# Patient Record
Sex: MALE | Race: WHITE | NOT HISPANIC OR LATINO
[De-identification: names, ages, dates, MRNs, and addresses within clinical notes are randomized per-mention and may not be internally consistent; named-entity substitution may affect disease eponyms.]

---

## 2017-05-08 ENCOUNTER — APPOINTMENT (OUTPATIENT)
Dept: OTOLARYNGOLOGY | Facility: CLINIC | Age: 62
End: 2017-05-08

## 2017-05-08 VITALS
WEIGHT: 175 LBS | HEART RATE: 70 BPM | SYSTOLIC BLOOD PRESSURE: 123 MMHG | BODY MASS INDEX: 26.52 KG/M2 | HEIGHT: 68 IN | DIASTOLIC BLOOD PRESSURE: 78 MMHG

## 2017-09-18 ENCOUNTER — APPOINTMENT (OUTPATIENT)
Dept: ORTHOPEDIC SURGERY | Facility: CLINIC | Age: 62
End: 2017-09-18
Payer: COMMERCIAL

## 2017-09-18 PROCEDURE — 72100 X-RAY EXAM L-S SPINE 2/3 VWS: CPT

## 2017-09-18 PROCEDURE — 72040 X-RAY EXAM NECK SPINE 2-3 VW: CPT

## 2017-09-18 PROCEDURE — 99214 OFFICE O/P EST MOD 30 MIN: CPT

## 2017-09-18 RX ORDER — TOBRAMYCIN AND DEXAMETHASONE 3; 1 MG/G; MG/G
0.3-0.1 OINTMENT OPHTHALMIC
Qty: 4 | Refills: 0 | Status: ACTIVE | COMMUNITY
Start: 2017-05-08

## 2017-09-18 RX ORDER — SILDENAFIL CITRATE 100 MG/1
100 TABLET, FILM COATED ORAL
Qty: 3 | Refills: 0 | Status: ACTIVE | COMMUNITY
Start: 2017-08-29

## 2017-09-18 RX ORDER — BENZONATATE 100 MG/1
100 CAPSULE ORAL
Qty: 42 | Refills: 0 | Status: ACTIVE | COMMUNITY
Start: 2017-07-12

## 2017-09-18 RX ORDER — BECLOMETHASONE DIPROPIONATE MONOHYDRATE 42 UG/1
42 SPRAY, SUSPENSION NASAL
Qty: 25 | Refills: 0 | Status: ACTIVE | COMMUNITY
Start: 2017-04-18

## 2017-09-18 RX ORDER — OLOPATADINE HYDROCHLORIDE 2 MG/ML
0.2 SOLUTION OPHTHALMIC
Qty: 3 | Refills: 0 | Status: ACTIVE | COMMUNITY
Start: 2017-06-19

## 2017-09-18 RX ORDER — FLUOROMETHOLONE 1 MG/G
0.1 OINTMENT OPHTHALMIC
Qty: 4 | Refills: 0 | Status: ACTIVE | COMMUNITY
Start: 2017-06-05

## 2017-10-23 ENCOUNTER — APPOINTMENT (OUTPATIENT)
Dept: ORTHOPEDIC SURGERY | Facility: CLINIC | Age: 62
End: 2017-10-23

## 2018-03-15 ENCOUNTER — APPOINTMENT (OUTPATIENT)
Dept: OTOLARYNGOLOGY | Facility: CLINIC | Age: 63
End: 2018-03-15
Payer: COMMERCIAL

## 2018-03-15 VITALS
HEIGHT: 68 IN | WEIGHT: 180 LBS | BODY MASS INDEX: 27.28 KG/M2 | DIASTOLIC BLOOD PRESSURE: 74 MMHG | SYSTOLIC BLOOD PRESSURE: 121 MMHG | HEART RATE: 72 BPM

## 2018-03-15 DIAGNOSIS — H92.03 OTALGIA, BILATERAL: ICD-10-CM

## 2018-03-15 DIAGNOSIS — H61.23 IMPACTED CERUMEN, BILATERAL: ICD-10-CM

## 2018-03-15 PROCEDURE — 69210 REMOVE IMPACTED EAR WAX UNI: CPT

## 2018-03-15 PROCEDURE — 99213 OFFICE O/P EST LOW 20 MIN: CPT | Mod: 25

## 2018-09-06 ENCOUNTER — APPOINTMENT (OUTPATIENT)
Dept: ORTHOPEDIC SURGERY | Facility: CLINIC | Age: 63
End: 2018-09-06
Payer: COMMERCIAL

## 2018-09-06 VITALS
HEIGHT: 68 IN | HEART RATE: 62 BPM | BODY MASS INDEX: 26.98 KG/M2 | DIASTOLIC BLOOD PRESSURE: 78 MMHG | WEIGHT: 178 LBS | SYSTOLIC BLOOD PRESSURE: 128 MMHG

## 2018-09-06 PROCEDURE — 99201 OFFICE OUTPATIENT NEW 10 MINUTES: CPT

## 2018-09-06 PROCEDURE — 73130 X-RAY EXAM OF HAND: CPT | Mod: LT

## 2018-09-27 ENCOUNTER — APPOINTMENT (OUTPATIENT)
Dept: ORTHOPEDIC SURGERY | Facility: CLINIC | Age: 63
End: 2018-09-27
Payer: COMMERCIAL

## 2018-09-27 DIAGNOSIS — Z86.03 PERSONAL HISTORY OF NEOPLASM OF UNCERTAIN BEHAVIOR: ICD-10-CM

## 2018-09-27 PROCEDURE — 99212 OFFICE O/P EST SF 10 MIN: CPT

## 2018-09-29 ENCOUNTER — APPOINTMENT (OUTPATIENT)
Dept: MRI IMAGING | Facility: CLINIC | Age: 63
End: 2018-09-29
Payer: COMMERCIAL

## 2018-09-29 ENCOUNTER — OUTPATIENT (OUTPATIENT)
Dept: OUTPATIENT SERVICES | Facility: HOSPITAL | Age: 63
LOS: 1 days | End: 2018-09-29
Payer: COMMERCIAL

## 2018-09-29 DIAGNOSIS — M79.642 PAIN IN LEFT HAND: ICD-10-CM

## 2018-09-29 PROCEDURE — 73218 MRI UPPER EXTREMITY W/O DYE: CPT | Mod: 26,LT

## 2018-09-29 PROCEDURE — 73218 MRI UPPER EXTREMITY W/O DYE: CPT

## 2018-10-12 ENCOUNTER — APPOINTMENT (OUTPATIENT)
Dept: ORTHOPEDIC SURGERY | Facility: CLINIC | Age: 63
End: 2018-10-12
Payer: COMMERCIAL

## 2018-10-12 PROCEDURE — 20600 DRAIN/INJ JOINT/BURSA W/O US: CPT | Mod: LT

## 2018-10-12 PROCEDURE — 73130 X-RAY EXAM OF HAND: CPT | Mod: LT

## 2018-10-12 PROCEDURE — 99213 OFFICE O/P EST LOW 20 MIN: CPT | Mod: 25

## 2019-03-21 ENCOUNTER — APPOINTMENT (OUTPATIENT)
Dept: ORTHOPEDIC SURGERY | Facility: CLINIC | Age: 64
End: 2019-03-21

## 2019-03-21 VITALS — BODY MASS INDEX: 26.98 KG/M2 | WEIGHT: 178 LBS | HEIGHT: 68 IN

## 2019-03-21 DIAGNOSIS — M77.9 ENTHESOPATHY, UNSPECIFIED: ICD-10-CM

## 2019-03-21 RX ORDER — SODIUM SULFATE, POTASSIUM SULFATE, MAGNESIUM SULFATE 17.5; 3.13; 1.6 G/ML; G/ML; G/ML
17.5-3.13-1.6 SOLUTION, CONCENTRATE ORAL
Qty: 354 | Refills: 0 | Status: DISCONTINUED | COMMUNITY
Start: 2018-02-23 | End: 2019-03-21

## 2019-03-21 RX ORDER — AMOXICILLIN 500 MG/1
500 CAPSULE ORAL
Qty: 42 | Refills: 0 | Status: DISCONTINUED | COMMUNITY
Start: 2017-07-02 | End: 2019-03-21

## 2019-03-21 RX ORDER — ACYCLOVIR 400 MG/1
400 TABLET ORAL
Qty: 21 | Refills: 0 | Status: DISCONTINUED | COMMUNITY
Start: 2016-10-26 | End: 2019-03-21

## 2019-03-21 RX ORDER — OSELTAMIVIR PHOSPHATE 75 MG/1
75 CAPSULE ORAL
Qty: 10 | Refills: 0 | Status: DISCONTINUED | COMMUNITY
Start: 2018-02-16 | End: 2019-03-21

## 2019-03-22 NOTE — DISCUSSION/SUMMARY
[de-identified] : The patient does not consent to a cortisone injection.\par An MRI of the left hand will be ordered to r/o soft tissue pathology of the small finger. \par NSAIDs as tolerated.\par

## 2019-03-22 NOTE — PHYSICAL EXAM
[de-identified] : 3view Xrays  AP, lateral and oblique views of the  left hand were obtained and reveal no abnormalities.  [de-identified] : Patient is well-nourished, well developed, alert and in no acute distress. Breathing is unlabored. He is grossly oriented to person, place and time.\par \par Right Hand: There is tenderness present over the proximal phalanx of the small finger, mild tenderness  over the A1 pulley. there is also edema present. He has full arc of motion in the fingers, all intrinsic and extrinsic hand muscles 5/5, no joint instability on provocative testing,  sensation intact to light touch, no skin lesions or discoloration \par

## 2019-03-22 NOTE — ADDENDUM
[FreeTextEntry1] : I, Radha Denney wrote this note acting as a scribe for Dr. Fernando Michelle on Mar 21, 2019.

## 2019-03-22 NOTE — HISTORY OF PRESENT ILLNESS
[Right] : right hand dominant [FreeTextEntry1] : Pt c/o left little finger pain x 7 weeks.  He states that the pain initially started whenever he abducted his fingers but last week the finger swelled and became discolored.  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  The swelling and discoloration has improved but he continues to have pain.  The pain is localized to the ulnar portion of the MP joint.  Denies triggering, numbness or tingling. He states the pain has not resolved. .

## 2019-03-22 NOTE — END OF VISIT
[FreeTextEntry3] : I, Fernando Michelle MD, ordering physician, have read and attest that all the information, medical decision making and discharge instructions within are true and accurate.

## 2019-03-25 ENCOUNTER — APPOINTMENT (OUTPATIENT)
Dept: ORTHOPEDIC SURGERY | Facility: CLINIC | Age: 64
End: 2019-03-25
Payer: COMMERCIAL

## 2019-03-25 PROCEDURE — 20550 NJX 1 TENDON SHEATH/LIGAMENT: CPT | Mod: F1

## 2019-03-25 PROCEDURE — 99214 OFFICE O/P EST MOD 30 MIN: CPT | Mod: 25

## 2019-03-25 NOTE — DISCUSSION/SUMMARY
[de-identified] : The patient wishes to proceed with a cortisone injection at this time. The skin was prepped with alcohol and sprayed with Ethyl Chloride. An injection of 0.5 cc 1% Lidocaine without epinephrine, 0.25 cc Kenalog 40mg, and 0.25 cc Dexamethasone was administered into the flexor tendon sheath of the left index finger. The patient tolerated the procedure well. Apply ice. \par \par The patient was advised to soak hand in warm water and Epsom salt. \par NSAIDs as tolerated. \par Follow up as needed. \par

## 2019-03-25 NOTE — HISTORY OF PRESENT ILLNESS
[de-identified] : Patient is a 63 year old male who presents today c/o left index finger pain and swelling.  He states that the pain initially started whenever he abducted his fingers.  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  The pain is localized to the ulnar portion of the MP and PIP joint. The pain is worse in the mornings. Denies triggering, numbness or tingling. \par \par

## 2019-03-25 NOTE — PHYSICAL EXAM
[de-identified] : Patient is well-nourished, well developed, alert and in no acute distress. Breathing is unlabored. He is grossly oriented to person, place and time.\par \par Left Hand: There is tenderness present over the proximal phalanx of the small finger, mild tenderness  over the A1 pulley. there is also edema present. He has full arc of motion in the fingers, all intrinsic and extrinsic hand muscles 5/5, no joint instability on provocative testing,  sensation intact to light touch, no skin lesions or discoloration \par  [de-identified] : Xrays reviewed from 09/27/2018: AP, lateral and oblique views of the  left hand were obtained and reveal no abnormalities.

## 2019-03-25 NOTE — ADDENDUM
[FreeTextEntry1] : I, Radha Denney wrote this note acting as a scribe for Dr. Fernando Michelle on Mar 25, 2019.

## 2019-04-22 ENCOUNTER — APPOINTMENT (OUTPATIENT)
Dept: ORTHOPEDIC SURGERY | Facility: CLINIC | Age: 64
End: 2019-04-22

## 2019-04-25 ENCOUNTER — APPOINTMENT (OUTPATIENT)
Dept: ORTHOPEDIC SURGERY | Facility: CLINIC | Age: 64
End: 2019-04-25
Payer: COMMERCIAL

## 2019-04-25 VITALS — BODY MASS INDEX: 26.98 KG/M2 | WEIGHT: 178 LBS | HEIGHT: 68 IN

## 2019-04-25 DIAGNOSIS — M79.642 PAIN IN LEFT HAND: ICD-10-CM

## 2019-04-25 DIAGNOSIS — M65.9 SYNOVITIS AND TENOSYNOVITIS, UNSPECIFIED: ICD-10-CM

## 2019-04-25 PROCEDURE — 20550 NJX 1 TENDON SHEATH/LIGAMENT: CPT | Mod: LT

## 2019-04-25 PROCEDURE — 99213 OFFICE O/P EST LOW 20 MIN: CPT | Mod: 25

## 2019-04-25 NOTE — ADDENDUM
[FreeTextEntry1] : I, Radha Denney wrote this note acting as a scribe for Dr. Fernando Michelle on Apr 25, 2019.

## 2019-04-25 NOTE — DISCUSSION/SUMMARY
[de-identified] : The patient wishes to proceed with a 2nd cortisone injection at this time. The skin was prepped with alcohol and sprayed with Ethyl Chloride. An injection of 0.5 cc 1% Lidocaine without epinephrine, 0.25 cc Kenalog 40mg, and 0.25 cc Dexamethasone was administered into the flexor tendon sheath of the left index finger. The patient tolerated the procedure well. Apply ice. \par \par The patient was advised to soak hand in warm water and Epsom salt. \par NSAIDs as tolerated. \par Follow up as needed. \par \par

## 2019-04-25 NOTE — HISTORY OF PRESENT ILLNESS
[de-identified] : Patient is a 64 year old male who presents today for a followup visit involving the left index finger pain and swelling.  He states that the pain initially started whenever he abducted his fingers.  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  The pain is localized to the ulnar portion of the MP and PIP joint. The pain is worse in the mornings. He was treated with a cortisone injection in this office on 04/25/2019. He says that the injection worked but his symptoms have since returned. He would like another injection today.

## 2019-04-25 NOTE — PHYSICAL EXAM
[de-identified] : Patient is well-nourished, well developed, alert and in no acute distress. Breathing is unlabored. He is grossly oriented to person, place and time.\par \par Left Hand: There is tenderness present over the proximal phalanx of the small finger, mild tenderness  over the A1 pulley. there is also edema present. He has full arc of motion in the fingers, all intrinsic and extrinsic hand muscles 5/5, no joint instability on provocative testing,  sensation intact to light touch, no skin lesions or discoloration \par  [de-identified] : No imaging done today.

## 2020-01-17 ENCOUNTER — OUTPATIENT (OUTPATIENT)
Dept: OUTPATIENT SERVICES | Facility: HOSPITAL | Age: 65
LOS: 1 days | End: 2020-01-17
Payer: COMMERCIAL

## 2020-01-17 ENCOUNTER — APPOINTMENT (OUTPATIENT)
Dept: ULTRASOUND IMAGING | Facility: CLINIC | Age: 65
End: 2020-01-17
Payer: COMMERCIAL

## 2020-01-17 DIAGNOSIS — Z00.8 ENCOUNTER FOR OTHER GENERAL EXAMINATION: ICD-10-CM

## 2020-01-17 PROCEDURE — 76870 US EXAM SCROTUM: CPT | Mod: 26

## 2020-01-17 PROCEDURE — 76870 US EXAM SCROTUM: CPT

## 2020-10-09 ENCOUNTER — TRANSCRIPTION ENCOUNTER (OUTPATIENT)
Age: 65
End: 2020-10-09

## 2021-07-12 ENCOUNTER — APPOINTMENT (OUTPATIENT)
Dept: ORTHOPEDIC SURGERY | Facility: CLINIC | Age: 66
End: 2021-07-12
Payer: COMMERCIAL

## 2021-07-12 VITALS
OXYGEN SATURATION: 95 % | HEART RATE: 67 BPM | WEIGHT: 189 LBS | DIASTOLIC BLOOD PRESSURE: 93 MMHG | BODY MASS INDEX: 28.64 KG/M2 | SYSTOLIC BLOOD PRESSURE: 137 MMHG | HEIGHT: 68 IN

## 2021-07-12 DIAGNOSIS — M54.5 LOW BACK PAIN: ICD-10-CM

## 2021-07-12 PROCEDURE — 99072 ADDL SUPL MATRL&STAF TM PHE: CPT

## 2021-07-12 PROCEDURE — 99214 OFFICE O/P EST MOD 30 MIN: CPT

## 2021-07-12 PROCEDURE — 72110 X-RAY EXAM L-2 SPINE 4/>VWS: CPT

## 2021-07-12 NOTE — PHYSICAL EXAM
[de-identified] : Lumbar Physical Exam\par \par Gait - Normal\par \par Station - Normal\par \par Sagittal balance - Normal\par \par Compensatory mechanism? - None\par \par Heel walk - Normal\par \par Toe walk - Normal\par \par Reflexes\par Patellar - normal\par Gastroc - normal\par Clonus - No\par \par Hip Exam - Normal\par \par Straight leg raise - none\par \par Pulses - 2+ dp/pt\par \par Range of motion - normal\par \par Sensation \par Sensation intact to light touch in L1, L2, L3, L4, L5 and S1 dermatomes bilaterally\par \par Motor\par 	IP	Quad	HS	TA	Gastroc	EHL\par Right	4/5	5/5	5/5	5/5	5/5	5/5\par Left	4/5	5/5	5/5	5/5	5/5	5/5 [de-identified] : Lumbar radiographs\par Degenerative scoliosis noted\par Disc height loss\par Slight motion at L4-L5 with flexion extension

## 2021-07-12 NOTE — HISTORY OF PRESENT ILLNESS
[de-identified] :  this is a 66-year-old male that has been dealing with severe low back pain for years.  Unfortunately acutely over the past several months it has severely worsened.  He has a stabbing pain in his right lower back.  He does describe pain that goes down his posterior thighs  bilaterally as well.  At this point it does interfere with his quality of life.  He does feel better when he leans forward on a shopping cart.  He denies any bowel bladder issues.  He denies any saddle anesthesia.

## 2021-07-12 NOTE — ASSESSMENT
[FreeTextEntry1] : I had a lengthy discussion with the patient in regards to their treatment plan and diagnosis.  They do have objective weakness findings on my exam.  Their symptoms have persisted despite the conservative management they have attempted thus far.  As a result I would like to proceed with a lumbar MRI.  In tandem with this they should begin physical therapy/home therapy program.  The patient can take Tylenol/NSAIDs as needed for pain control if medically able to.  I will have the patient follow-up in 3 to 4 weeks for repeat clinical evaluation.  I encouraged them to follow-up sooner if their symptoms worsen or change in any way.  Please note that over 30 minutes of time spent in care of this patient which includes previsit preparation, in person visit, post visit documentation.

## 2021-07-23 ENCOUNTER — OUTPATIENT (OUTPATIENT)
Dept: OUTPATIENT SERVICES | Facility: HOSPITAL | Age: 66
LOS: 1 days | End: 2021-07-23
Payer: SELF-PAY

## 2021-07-23 ENCOUNTER — APPOINTMENT (OUTPATIENT)
Dept: MRI IMAGING | Facility: IMAGING CENTER | Age: 66
End: 2021-07-23
Payer: SELF-PAY

## 2021-07-23 DIAGNOSIS — M54.5 LOW BACK PAIN: ICD-10-CM

## 2021-07-23 PROCEDURE — 72148 MRI LUMBAR SPINE W/O DYE: CPT

## 2021-07-23 PROCEDURE — 72148 MRI LUMBAR SPINE W/O DYE: CPT | Mod: 26

## 2021-08-04 ENCOUNTER — APPOINTMENT (OUTPATIENT)
Dept: MRI IMAGING | Facility: CLINIC | Age: 66
End: 2021-08-04

## 2021-08-06 ENCOUNTER — APPOINTMENT (OUTPATIENT)
Dept: ORTHOPEDIC SURGERY | Facility: CLINIC | Age: 66
End: 2021-08-06
Payer: COMMERCIAL

## 2021-08-06 DIAGNOSIS — M51.36 OTHER INTERVERTEBRAL DISC DEGENERATION, LUMBAR REGION: ICD-10-CM

## 2021-08-06 PROCEDURE — 99213 OFFICE O/P EST LOW 20 MIN: CPT | Mod: 95

## 2021-08-06 NOTE — HISTORY OF PRESENT ILLNESS
[de-identified] : Today the patient states that overall he is doing very well.  He has lost weight.  He feels like his low back pain has significantly improved.  He denies any severe radiating pain down his legs.  He denies any weakness.  He denies any focal areas of numbness or tingling.  He denies any bowel bladder issues.  He denies any saddle anesthesia.\par \par 07/12/21\par  this is a 66-year-old male that has been dealing with severe low back pain for years.  Unfortunately acutely over the past several months it has severely worsened.  He has a stabbing pain in his right lower back.  He does describe pain that goes down his posterior thighs  bilaterally as well.  At this point it does interfere with his quality of life.  He does feel better when he leans forward on a shopping cart.  He denies any bowel bladder issues.  He denies any saddle anesthesia.

## 2021-08-06 NOTE — ASSESSMENT
[FreeTextEntry1] : I had a lengthy discussion with the patient in regards to treatment plan and diagnosis. There are no red flag findings on imaging nor are there any red flag findings on clinical exam.  Therefore we will proceed with a course of conservative treatment.  This would include physical therapy/home exercise program, Tylenol, NSAIDs as medically indicated.  The patient will follow up with me in approximately 8-12 weeks.  I encouraged the patient to follow-up sooner if there are any new or worsening symptoms.

## 2021-08-06 NOTE — PHYSICAL EXAM
[de-identified] : Lumbar Physical Exam\par \par Gait - Normal\par \par Station - Normal\par \par Sagittal balance - Normal\par \par Compensatory mechanism? - None\par \par Heel walk - Normal\par \par Toe walk - Normal\par \par Reflexes\par Patellar - normal\par Gastroc - normal\par Clonus - No\par \par Hip Exam - Normal\par \par Straight leg raise - none\par \par Pulses - 2+ dp/pt\par \par Range of motion - normal\par \par Sensation \par Sensation intact to light touch in L1, L2, L3, L4, L5 and S1 dermatomes bilaterally\par \par Motor\par 	IP	Quad	HS	TA	Gastroc	EHL\par Right	5/5	5/5	5/5	5/5	5/5	5/5\par Left	5/5	5/5	5/5	5/5	5/5	5/5 [de-identified] : Lumbar radiographs\par Degenerative scoliosis noted\par Disc height loss\par Slight motion at L4-L5 with flexion extension\par \par Lumbar MRI reviewed\par No areas of critical central stenosis\par No areas of critical foraminal stenosis

## 2021-08-06 NOTE — REASON FOR VISIT
[Follow-Up Visit] : a follow-up visit for [Back Pain] : back pain [Radiculopathy] : radiculopathy [Home] : at home, [unfilled] , at the time of the visit. [Medical Office: ([facility])___] : at the medical office located in  [Verbal consent obtained from patient] : the patient, [unfilled]

## 2022-10-14 ENCOUNTER — NON-APPOINTMENT (OUTPATIENT)
Age: 67
End: 2022-10-14

## 2022-10-24 ENCOUNTER — EMERGENCY (EMERGENCY)
Facility: HOSPITAL | Age: 67
LOS: 1 days | Discharge: HOME | End: 2022-10-24
Attending: EMERGENCY MEDICINE | Admitting: EMERGENCY MEDICINE

## 2022-10-24 VITALS
OXYGEN SATURATION: 98 % | TEMPERATURE: 98 F | HEART RATE: 165 BPM | WEIGHT: 186.07 LBS | DIASTOLIC BLOOD PRESSURE: 91 MMHG | RESPIRATION RATE: 17 BRPM | SYSTOLIC BLOOD PRESSURE: 168 MMHG

## 2022-10-24 DIAGNOSIS — Z98.890 OTHER SPECIFIED POSTPROCEDURAL STATES: Chronic | ICD-10-CM

## 2022-10-24 LAB
ALBUMIN SERPL ELPH-MCNC: 3.8 G/DL — SIGNIFICANT CHANGE UP (ref 3.4–5)
ALP SERPL-CCNC: 47 U/L — SIGNIFICANT CHANGE UP (ref 40–120)
ALT FLD-CCNC: 27 U/L — SIGNIFICANT CHANGE UP (ref 12–42)
ANION GAP SERPL CALC-SCNC: 7 MMOL/L — LOW (ref 9–16)
APTT BLD: 34 SEC — SIGNIFICANT CHANGE UP (ref 27.5–35.5)
AST SERPL-CCNC: 33 U/L — SIGNIFICANT CHANGE UP (ref 15–37)
BASOPHILS # BLD AUTO: 0.07 K/UL — SIGNIFICANT CHANGE UP (ref 0–0.2)
BASOPHILS NFR BLD AUTO: 1.2 % — SIGNIFICANT CHANGE UP (ref 0–2)
BILIRUB SERPL-MCNC: 0.6 MG/DL — SIGNIFICANT CHANGE UP (ref 0.2–1.2)
BUN SERPL-MCNC: 17 MG/DL — SIGNIFICANT CHANGE UP (ref 7–23)
CALCIUM SERPL-MCNC: 8.9 MG/DL — SIGNIFICANT CHANGE UP (ref 8.5–10.5)
CHLORIDE SERPL-SCNC: 106 MMOL/L — SIGNIFICANT CHANGE UP (ref 96–108)
CO2 SERPL-SCNC: 28 MMOL/L — SIGNIFICANT CHANGE UP (ref 22–31)
CREAT SERPL-MCNC: 1.11 MG/DL — SIGNIFICANT CHANGE UP (ref 0.5–1.3)
EGFR: 74 ML/MIN/1.73M2 — SIGNIFICANT CHANGE UP
EOSINOPHIL # BLD AUTO: 0.14 K/UL — SIGNIFICANT CHANGE UP (ref 0–0.5)
EOSINOPHIL NFR BLD AUTO: 2.4 % — SIGNIFICANT CHANGE UP (ref 0–6)
GLUCOSE SERPL-MCNC: 95 MG/DL — SIGNIFICANT CHANGE UP (ref 70–99)
HCT VFR BLD CALC: 44.7 % — SIGNIFICANT CHANGE UP (ref 39–50)
HGB BLD-MCNC: 14.7 G/DL — SIGNIFICANT CHANGE UP (ref 13–17)
IMM GRANULOCYTES NFR BLD AUTO: 0.2 % — SIGNIFICANT CHANGE UP (ref 0–0.9)
INR BLD: 1.09 — SIGNIFICANT CHANGE UP (ref 0.88–1.16)
LYMPHOCYTES # BLD AUTO: 2.01 K/UL — SIGNIFICANT CHANGE UP (ref 1–3.3)
LYMPHOCYTES # BLD AUTO: 34.8 % — SIGNIFICANT CHANGE UP (ref 13–44)
MAGNESIUM SERPL-MCNC: 2 MG/DL — SIGNIFICANT CHANGE UP (ref 1.6–2.6)
MCHC RBC-ENTMCNC: 28.5 PG — SIGNIFICANT CHANGE UP (ref 27–34)
MCHC RBC-ENTMCNC: 32.9 GM/DL — SIGNIFICANT CHANGE UP (ref 32–36)
MCV RBC AUTO: 86.6 FL — SIGNIFICANT CHANGE UP (ref 80–100)
MONOCYTES # BLD AUTO: 0.61 K/UL — SIGNIFICANT CHANGE UP (ref 0–0.9)
MONOCYTES NFR BLD AUTO: 10.6 % — SIGNIFICANT CHANGE UP (ref 2–14)
NEUTROPHILS # BLD AUTO: 2.93 K/UL — SIGNIFICANT CHANGE UP (ref 1.8–7.4)
NEUTROPHILS NFR BLD AUTO: 50.8 % — SIGNIFICANT CHANGE UP (ref 43–77)
NRBC # BLD: 0 /100 WBCS — SIGNIFICANT CHANGE UP (ref 0–0)
NT-PROBNP SERPL-SCNC: 157 PG/ML — SIGNIFICANT CHANGE UP
PLATELET # BLD AUTO: 196 K/UL — SIGNIFICANT CHANGE UP (ref 150–400)
POTASSIUM SERPL-MCNC: 3.8 MMOL/L — SIGNIFICANT CHANGE UP (ref 3.5–5.3)
POTASSIUM SERPL-SCNC: 3.8 MMOL/L — SIGNIFICANT CHANGE UP (ref 3.5–5.3)
PROT SERPL-MCNC: 7 G/DL — SIGNIFICANT CHANGE UP (ref 6.4–8.2)
PROTHROM AB SERPL-ACNC: 12.8 SEC — SIGNIFICANT CHANGE UP (ref 10.5–13.4)
RBC # BLD: 5.16 M/UL — SIGNIFICANT CHANGE UP (ref 4.2–5.8)
RBC # FLD: 13.3 % — SIGNIFICANT CHANGE UP (ref 10.3–14.5)
SARS-COV-2 RNA SPEC QL NAA+PROBE: SIGNIFICANT CHANGE UP
SODIUM SERPL-SCNC: 141 MMOL/L — SIGNIFICANT CHANGE UP (ref 132–145)
TROPONIN I, HIGH SENSITIVITY RESULT: 8.9 NG/L — SIGNIFICANT CHANGE UP
WBC # BLD: 5.77 K/UL — SIGNIFICANT CHANGE UP (ref 3.8–10.5)
WBC # FLD AUTO: 5.77 K/UL — SIGNIFICANT CHANGE UP (ref 3.8–10.5)

## 2022-10-24 PROCEDURE — 71045 X-RAY EXAM CHEST 1 VIEW: CPT | Mod: 26

## 2022-10-24 PROCEDURE — 93010 ELECTROCARDIOGRAM REPORT: CPT | Mod: 76

## 2022-10-24 PROCEDURE — 99291 CRITICAL CARE FIRST HOUR: CPT

## 2022-10-24 RX ORDER — OMEGA-3 ACID ETHYL ESTERS 1 G
0 CAPSULE ORAL
Qty: 0 | Refills: 0 | DISCHARGE

## 2022-10-24 RX ORDER — DILTIAZEM HCL 120 MG
20 CAPSULE, EXT RELEASE 24 HR ORAL ONCE
Refills: 0 | Status: COMPLETED | OUTPATIENT
Start: 2022-10-24 | End: 2022-10-24

## 2022-10-24 RX ORDER — DILTIAZEM HCL 120 MG
90 CAPSULE, EXT RELEASE 24 HR ORAL ONCE
Refills: 0 | Status: COMPLETED | OUTPATIENT
Start: 2022-10-24 | End: 2022-10-24

## 2022-10-24 RX ORDER — ALPRAZOLAM 0.25 MG
1 TABLET ORAL ONCE
Refills: 0 | Status: DISCONTINUED | OUTPATIENT
Start: 2022-10-24 | End: 2022-10-24

## 2022-10-24 RX ORDER — CHOLECALCIFEROL (VITAMIN D3) 125 MCG
0 CAPSULE ORAL
Qty: 0 | Refills: 0 | DISCHARGE

## 2022-10-24 RX ORDER — APIXABAN 2.5 MG/1
5 TABLET, FILM COATED ORAL ONCE
Refills: 0 | Status: COMPLETED | OUTPATIENT
Start: 2022-10-24 | End: 2022-10-24

## 2022-10-24 RX ADMIN — Medication 90 MILLIGRAM(S): at 22:26

## 2022-10-24 RX ADMIN — APIXABAN 5 MILLIGRAM(S): 2.5 TABLET, FILM COATED ORAL at 16:47

## 2022-10-24 RX ADMIN — Medication 20 MILLIGRAM(S): at 14:14

## 2022-10-24 RX ADMIN — Medication 1 MILLIGRAM(S): at 23:57

## 2022-10-24 RX ADMIN — Medication 90 MILLIGRAM(S): at 14:39

## 2022-10-24 NOTE — ED PROVIDER NOTE - PHYSICAL EXAMINATION
VITAL SIGNS: I have reviewed nursing notes and confirm.  CONSTITUTIONAL: Well-developed; well-nourished; in no acute distress.  SKIN: Skin is warm and dry, no acute rash.  HEAD: Normocephalic; atraumatic.  EYES: PERRL, EOM intact; conjunctiva and sclera clear.  ENT: No nasal discharge; airway clear.  NECK: Supple; non tender.  CARD: S1, S2 normal; no murmurs, gallops, or rubs. Regular rate and rhythm.  RESP: No wheezes, rales or rhonchi.  ABD: Normal bowel sounds; soft; non-distended; non-tender; no hepatosplenomegaly.  MSK: Normal ROM. No clubbing, cyanosis or edema.  NEURO: Alert, oriented. Grossly unremarkable.  PSYCH: Cooperative, appropriate. VITAL SIGNS: I have reviewed nursing notes and confirm.  CONSTITUTIONAL: Well-developed; well-nourished; in no acute distress.  SKIN: Skin is warm and dry.  HEAD: Normocephalic; atraumatic.  EYES: Clear b/l.  ENT: On nasal cannula.   NECK: Supple; non tender.  CARD: Tachycardic, irregular rhythm.   RESP: No respiratory distress.   ABD: Soft; non-distended; non-tender.  MSK: Normal ROM. No clubbing, cyanosis or edema.  NEURO: Alert, oriented. Grossly unremarkable.  PSYCH: Cooperative, appropriate. VITAL SIGNS: I have reviewed nursing notes and confirm.  CONSTITUTIONAL: Well-developed; well-nourished; in no acute distress.  SKIN: Skin is warm and dry.  HEAD: Normocephalic; atraumatic.  EYES: Clear b/l.  NECK: Supple; non tender.  CARD: Tachycardic, irregular rhythm.   RESP: No respiratory distress. CTA b/l  ABD: Soft; non-distended; non-tender.  MSK: Normal ROM. No clubbing, cyanosis or edema.  NEURO: Alert, oriented. Grossly unremarkable.  PSYCH: Cooperative, appropriate.

## 2022-10-24 NOTE — ED PROVIDER NOTE - PROGRESS NOTE DETAILS
[facility] [phone]  spoke to Dr. Jasen Younger covering lisa Mcclellan accepting physician at [facility] in [facility] [phone]  spoke to Dr. Jasen Younger covering lisa Mcclellan accepting physician at [place] in [facility] Pt admitted to [facility], however wants to be transferred to [facility] on [address].   [facility] called and case discussed- phone number [phone].   Dr. Mcclellan is the accepting physician (cardiologist) at [facility] and case discussed with him.   Paperwork faxed over. Will get call back from [facility] with bed availability. Pt made aware of this plan.   spoke to Dr. Jasen Younger covering lisa Mcclellan accepting physician at [facility] in [facility] Pt admitted to [facility]/ cardiology, however pt wants to be transferred to [facility] on [address].   [facility] called and case discussed- phone number [phone].   Dr. Mcclellan is the accepting physician (cardiologist) at [facility] and case discussed with him.   Paperwork faxed over. Will get call back from [facility] with bed availability. Pt made aware of this plan.   Also spoke to Dr. Jasen Younger covering for pt's Cardiologist Dr. Jim Corado and case discussed. Overnight patient had no events heart rate remains in the high 80s sometimes goes up to 100 patient received 90 mg of Cardizem at signout from prior attending at 10 PM we will redosed in the morning, as well as give 5 mg of his Eliquis in the morning.  Repeat morning labs are within normal limits.  There are currently no cardiac telemetry beds and patient is awaiting a cardiac telemetry bed at [facility] we will sign out to morning a.mElliot Horner overnight no events, given am eliquis dose and iv and po diltiazem. signed out to am md pending transfer. patient endorsed pending [facility] or [facility] bed. overnight dosed with po diltiazem, and had received IV prior. DARRYL: Patient signed out to me by Dr. España at 8am. Patient is resting comfortably, NAD.  Dr. Montes De Oca at bedside. DARRYL: Patient seen by Dr. Guzman. Recommended diltiazem 60mg twice a day and Eliquis 5mg twice a day. will follow up with his own cardiologist within a week. Return to the ED immediately if getting worse, not improving, or if having any new or troubling symptoms. DARRYL: Patient signed out to me by Dr. España at 8am. Patient is resting comfortably, NAD.  Dr. Guzman at bedside.

## 2022-10-24 NOTE — ED PROVIDER NOTE - CLINICAL SUMMARY MEDICAL DECISION MAKING FREE TEXT BOX
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 ED course: vital signs noted. Pt is afebrile, tachycardic with HR in the 150s, hypertensive with BP at 160/91, and the rest of the vitals are within normal limits. ECG done immediately and pt found to be in rapid Afib with HR in the 150s. No nonspecific ST changes. Will obtain cardiac workup, including cardiac enzymes, and chest X-ray. Pt given Cardizem IV and PO dose for rate control. To be admitted to cardiology for Afib with rapid ventricular response. 68 yo M PMHx of Afib and L sided thoracotomy due to capillary hemangioma in his lung decades ago, takes lorazepam to sleep, presenting today for palpitations, SOB, lightheadedness, dizziness, and near syncope. Pt states that around 11:17AM today he was waiting for the train but it switched tracks, causing him to go up a couple of flight of stairs and then down again which is when the symptoms began. Pt went to the closest Urgent care and was found to be in afib with RVR and was sent to [facility] for further evaluation.  Pt had an episode of afib with RVR 15 years ago and was cardioverted at that time. He was not started on any anti-coagulation. At that time pt had a stress test and echo which were normal. He has had no episodes of afib since them. No other hx of heart issues, CAD or heart failure. Non smoker, social alcohol drinker, no drug use. Denies CP, abdominal pain, leg pain or swelling, fever, chills.    ED course: vital signs noted. Pt is afebrile, tachycardic with HR in the 150s, hypertensive with BP at 160/91, and the rest of the vitals are within normal limits. ECG done immediately and pt found to be in rapid Afib with HR in the 140s and with nonspecific ST changes. CXR with NAD. Labs noted and WNL. Trop negative. Pt given Cardizem IV and PO for rate control with HR improving to 90s/low 100s. Remained in afib. Eliquis given. Pt admitted to Cardiology for afib with RVR. Case discussed with Dr. Bustamante (Cardiology).

## 2022-10-24 NOTE — ED PROVIDER NOTE - WR ORDER DATE AND TIME 1
24-Oct-2022 14:08
I will STOP taking the medications listed below when I get home from the hospital:    hydroCHLOROthiazide 12.5 mg oral capsule  -- 1 cap(s) by mouth once a day    metoprolol tartrate 25 mg oral tablet  -- 0.5 tab(s) by mouth every 12 hours

## 2022-10-24 NOTE — ED PROVIDER NOTE - PATIENT PORTAL LINK FT
You can access the FollowMyHealth Patient Portal offered by [facility] by registering at the following website: [email or web address]. By joining [email or web address]’s FollowMyHealth portal, you will also be able to view your health information using other applications (apps) compatible with our system.

## 2022-10-24 NOTE — ED ADULT NURSE NOTE - CHIEF COMPLAINT QUOTE
pt. brought in by EMS picked up from [facility] for possible afib. Pt. reports he was rushing to the train when he started to feel his heart race

## 2022-10-24 NOTE — ED PROVIDER NOTE - NSFOLLOWUPINSTRUCTIONS_ED_ALL_ED_FT
A-fib (Atrial Fibrillation)    WHAT YOU NEED TO KNOW:    Atrial fibrillation (A-fib) is an irregular heartbeat. It reduces your heart's ability to pump blood through your body. A-fib may come and go, or it may be a long-term condition. A-fib can cause blood clots, stroke, or heart failure. These conditions may become life-threatening. It is important to treat and manage A-fib to help prevent a blood clot, stroke, or heart failure.  Heart Chambers         DISCHARGE INSTRUCTIONS:    Call your local emergency number (911 in the [place]) or have someone call if:   •You have any of the following signs of a heart attack: ?Squeezing, pressure, or pain in your chest      ?You may also have any of the following: ?Discomfort or pain in your back, neck, jaw, stomach, or arm      ?Shortness of breath      ?Nausea or vomiting      ?Lightheadedness or a sudden cold sweat        •You have any of the following signs of a stroke: ?Numbness or drooping on one side of your face       ?Weakness in an arm or leg      ?Confusion or difficulty speaking      ?Dizziness, a severe headache, or vision loss        Return to the emergency department if:   •Your arm or leg feels warm, tender, and painful. It may look swollen and red.      •Your heart rate is more than 110 beats per minute.      •You are short of breath, even at rest.      Call your doctor or cardiologist if:   •You have new or worsening swelling in your legs, feet, ankles, or abdomen.       •You have questions or concerns about your condition or care.      Medicines: You may need any of the following:  •Heart medicines help control your heart rate or rhythm. You may need more than one medicine to treat your symptoms.      •Blood thinners help prevent blood clots. Clots can cause strokes, heart attacks, and death. The following are general safety guidelines to follow while you are taking a blood thinner:?Watch for bleeding and bruising while you take blood thinners. Watch for bleeding from your gums or nose. Watch for blood in your urine and bowel movements. Use a soft washcloth on your skin, and a soft toothbrush to brush your teeth. This can keep your skin and gums from bleeding. If you shave, use an electric shaver. Do not play contact sports.       ?Tell your dentist and other healthcare providers that you take a blood thinner. Wear a bracelet or necklace that says you take this medicine.       ?Do not start or stop any other medicines unless your healthcare provider tells you to. Many medicines cannot be used with blood thinners.      ?Take your blood thinner exactly as prescribed by your healthcare provider. Do not skip does or take less than prescribed. Tell your provider right away if you forget to take your blood thinner, or if you take too much.      ?Warfarin is a blood thinner that you may need to take. The following are things you should be aware of if you take warfarin: ?Foods and medicines can affect the amount of warfarin in your blood. Do not make major changes to your diet while you take warfarin. Warfarin works best when you eat about the same amount of vitamin K every day. Vitamin K is found in green leafy vegetables and certain other foods. Ask for more information about what to eat when you are taking warfarin.      ?You will need to see your healthcare provider for follow-up visits when you are on warfarin. You will need regular blood tests. These tests are used to decide how much medicine you need.         •Antiplatelets, such as aspirin, help prevent blood clots. Take your antiplatelet medicine exactly as directed. These medicines make it more likely for you to bleed or bruise. If you are told to take aspirin, do not take acetaminophen or ibuprofen instead.      •Take your medicine as directed. Contact your healthcare provider if you think your medicine is not helping or if you have side effects. Tell your provider if you are allergic to any medicine. Keep a list of the medicines, vitamins, and herbs you take. Include the amounts, and when and why you take them. Bring the list or the pill bottles to follow-up visits. Carry your medicine list with you in case of an emergency.      Manage A-fib:   •Know your target heart rate. Learn how to check your pulse and monitor your heart rate.  How to Take a Pulse           •Know the risks if you choose to drink alcohol. Alcohol can increase your risk for A-fib or make A-fib harder to manage. Ask your healthcare provider if it is okay for you to drink any alcohol. He or she can help you set limits for the number of drinks you have in 24 hours and in a week. A drink of alcohol is 12 ounces of beer, 5 ounces of wine, or 1½ ounces of liquor.      •Do not smoke. Nicotine can cause heart damage and make it more difficult to manage your A-fib. Do not use e-cigarettes or smokeless tobacco in place of cigarettes or to help you quit. They still contain nicotine. Ask your healthcare provider for information if you currently smoke and need help quitting.      •Eat heart-healthy foods. Heart healthy foods will help keep your cholesterol low. These include fruits, vegetables, whole-grain breads, low-fat dairy products, beans, lean meats, and fish. Replace butter and margarine with heart-healthy oils such as olive oil and canola oil.             •Maintain a healthy weight. Ask your healthcare provider what a healthy weight is for you. Ask him or her to help you create a safe weight loss plan if needed. Even a small goal of a 10% weight loss can improve your heart health.      •Get regular physical activity. Physical activity helps improve your heart health. Get at least 150 minutes of moderate aerobic physical activity each week. Your healthcare provider can help you create an activity plan.  [employer] Walking for Exercise           •Manage other health conditions. This includes high blood pressure or cholesterol, sleep apnea, diabetes, and other heart conditions. Take medicine as directed and follow your treatment plan. Your healthcare provider may need to change a medicine you are taking if it is causing your A-fib. Do not stop taking any medicine unless directed by your provider.      Follow up with your doctor or cardiologist as directed: You will need regular blood tests and monitoring. Write down your questions so you remember to ask them during your visits.       © Copyright [employer] 2022           back to top                          © Copyright [employer] 2022

## 2022-10-24 NOTE — ED ADULT NURSE REASSESSMENT NOTE - REASSESS COMMUNICATION
/77, , afib on monitor. MD verbalized understanding, states give PO cardizem and rpt EKG/ED physician notified

## 2022-10-24 NOTE — ED ADULT TRIAGE NOTE - CHIEF COMPLAINT QUOTE
pt. brought in by EMS picked up from [facility] for possible afib. Pt. reports he was rushing to the train when he started to feel his heart race

## 2022-10-24 NOTE — ED PROVIDER NOTE - OBJECTIVE STATEMENT
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 Pt states that around 11:17AM today he was waiting for the train but it switched tracks, causing him to go up a flight of stairs and then down again, which is when the symptoms began. Pt went to the closest  and was sent here for further evaluation.  Pt believes he might be in Afib because 15 years ago pt contacted his PCP about an elevated HR after a chiropractor workout and was told by his PCP that he might have Afib. Pt then had a stress test, which was normal, and had been cardioverted at [facility] in [place]. Since then, he has had no episodes. Not placed on anticoagulations. No other hx of heart issues. Non smoker, social alcohol drinker, no drug use. Allergy to Levaquin. Denies CP, abdominal pain, leg pain or swelling, fever, chills. 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 Pt states that around 11:17AM today he was waiting for the train but it switched tracks, causing him to go up a flight of stairs and then down again, which is when the symptoms began. Pt went to the closest  and was sent here for further evaluation.  Pt believes he might be in Afib because 15 years ag, he had was diagnosed with Afib. At that time, he had a stress test, which was normal, and then had been cardioverted at [facility] in [place]. Since then, he has had no episodes with this being the first. Not started on anticoagulations. No other hx of heart issues. Non smoker, social alcohol drinker, no drug use. Allergy to Levaquin. Denies CP, abdominal pain, leg pain or swelling, fever, chills. 66 yo M PMHx of Afib and L sided thoracotomy due to capillary hemangioma in his lung decades ago, takes lorazepam to sleep, presenting today for palpitations, SOB, lightheadedness, dizziness, and near syncope. Pt states that around 11:17AM today he was waiting for the train but it switched tracks, causing him to go up a couple of flight of stairs and then down again which is when the symptoms began. Pt went to the closest Urgent care and was found to be in afib with RVR and was sent to [facility] for further evaluation.  Pt had an episode of afib with RVR 15 years ago and was cardioverted at that time. He was not started on any anti-coagulation. At that time pt had a stress test and echo which were normal. He has had no episodes of afib since them. No other hx of heart issues, CAD or heart failure. Non smoker, social alcohol drinker, no drug use. Denies CP, abdominal pain, leg pain or swelling, fever, chills.

## 2022-10-24 NOTE — ED ADULT NURSE REASSESSMENT NOTE - NS ED NURSE REASSESS COMMENT FT1
Pt AAOx4, notified of orders for bedrest, but continues to get OOB for BR and other things around the room. Pt endorses mild palpitations with exertion, but states SOB has improved. Cardiac monitoring continued.

## 2022-10-24 NOTE — ED ADULT NURSE NOTE - OBJECTIVE STATEMENT
Pt AAOx4, c/o sudden onset chest palpitations and SOB. States h/o afib with cardioversion in the past, not on any meds at this time.

## 2022-10-25 ENCOUNTER — APPOINTMENT (OUTPATIENT)
Dept: ORTHOPEDIC SURGERY | Facility: CLINIC | Age: 67
End: 2022-10-25

## 2022-10-25 VITALS
DIASTOLIC BLOOD PRESSURE: 76 MMHG | SYSTOLIC BLOOD PRESSURE: 112 MMHG | RESPIRATION RATE: 18 BRPM | OXYGEN SATURATION: 95 % | TEMPERATURE: 98 F | HEART RATE: 78 BPM

## 2022-10-25 LAB
ALBUMIN SERPL ELPH-MCNC: 3.3 G/DL — LOW (ref 3.4–5)
ALP SERPL-CCNC: 48 U/L — SIGNIFICANT CHANGE UP (ref 40–120)
ALT FLD-CCNC: 23 U/L — SIGNIFICANT CHANGE UP (ref 12–42)
ANION GAP SERPL CALC-SCNC: 6 MMOL/L — LOW (ref 9–16)
AST SERPL-CCNC: 17 U/L — SIGNIFICANT CHANGE UP (ref 15–37)
BASOPHILS # BLD AUTO: 0.06 K/UL — SIGNIFICANT CHANGE UP (ref 0–0.2)
BASOPHILS NFR BLD AUTO: 0.9 % — SIGNIFICANT CHANGE UP (ref 0–2)
BILIRUB SERPL-MCNC: 0.5 MG/DL — SIGNIFICANT CHANGE UP (ref 0.2–1.2)
BUN SERPL-MCNC: 18 MG/DL — SIGNIFICANT CHANGE UP (ref 7–23)
CALCIUM SERPL-MCNC: 8.9 MG/DL — SIGNIFICANT CHANGE UP (ref 8.5–10.5)
CHLORIDE SERPL-SCNC: 106 MMOL/L — SIGNIFICANT CHANGE UP (ref 96–108)
CK SERPL-CCNC: 102 U/L — SIGNIFICANT CHANGE UP (ref 39–308)
CO2 SERPL-SCNC: 28 MMOL/L — SIGNIFICANT CHANGE UP (ref 22–31)
CREAT SERPL-MCNC: 1 MG/DL — SIGNIFICANT CHANGE UP (ref 0.5–1.3)
EGFR: 84 ML/MIN/1.73M2 — SIGNIFICANT CHANGE UP
EOSINOPHIL # BLD AUTO: 0.22 K/UL — SIGNIFICANT CHANGE UP (ref 0–0.5)
EOSINOPHIL NFR BLD AUTO: 3.3 % — SIGNIFICANT CHANGE UP (ref 0–6)
GLUCOSE SERPL-MCNC: 109 MG/DL — HIGH (ref 70–99)
HCT VFR BLD CALC: 41.8 % — SIGNIFICANT CHANGE UP (ref 39–50)
HGB BLD-MCNC: 13.8 G/DL — SIGNIFICANT CHANGE UP (ref 13–17)
IMM GRANULOCYTES NFR BLD AUTO: 0.1 % — SIGNIFICANT CHANGE UP (ref 0–0.9)
LYMPHOCYTES # BLD AUTO: 2.18 K/UL — SIGNIFICANT CHANGE UP (ref 1–3.3)
LYMPHOCYTES # BLD AUTO: 32.3 % — SIGNIFICANT CHANGE UP (ref 13–44)
MAGNESIUM SERPL-MCNC: 2 MG/DL — SIGNIFICANT CHANGE UP (ref 1.6–2.6)
MCHC RBC-ENTMCNC: 28.2 PG — SIGNIFICANT CHANGE UP (ref 27–34)
MCHC RBC-ENTMCNC: 33 GM/DL — SIGNIFICANT CHANGE UP (ref 32–36)
MCV RBC AUTO: 85.5 FL — SIGNIFICANT CHANGE UP (ref 80–100)
MONOCYTES # BLD AUTO: 0.6 K/UL — SIGNIFICANT CHANGE UP (ref 0–0.9)
MONOCYTES NFR BLD AUTO: 8.9 % — SIGNIFICANT CHANGE UP (ref 2–14)
NEUTROPHILS # BLD AUTO: 3.68 K/UL — SIGNIFICANT CHANGE UP (ref 1.8–7.4)
NEUTROPHILS NFR BLD AUTO: 54.5 % — SIGNIFICANT CHANGE UP (ref 43–77)
NRBC # BLD: 0 /100 WBCS — SIGNIFICANT CHANGE UP (ref 0–0)
NT-PROBNP SERPL-SCNC: 1883 PG/ML — HIGH
PLATELET # BLD AUTO: 172 K/UL — SIGNIFICANT CHANGE UP (ref 150–400)
POTASSIUM SERPL-MCNC: 3.7 MMOL/L — SIGNIFICANT CHANGE UP (ref 3.5–5.3)
POTASSIUM SERPL-SCNC: 3.7 MMOL/L — SIGNIFICANT CHANGE UP (ref 3.5–5.3)
PROT SERPL-MCNC: 6.2 G/DL — LOW (ref 6.4–8.2)
RBC # BLD: 4.89 M/UL — SIGNIFICANT CHANGE UP (ref 4.2–5.8)
RBC # FLD: 13.5 % — SIGNIFICANT CHANGE UP (ref 10.3–14.5)
SODIUM SERPL-SCNC: 140 MMOL/L — SIGNIFICANT CHANGE UP (ref 132–145)
TROPONIN I, HIGH SENSITIVITY RESULT: 18.7 NG/L — SIGNIFICANT CHANGE UP
WBC # BLD: 6.75 K/UL — SIGNIFICANT CHANGE UP (ref 3.8–10.5)
WBC # FLD AUTO: 6.75 K/UL — SIGNIFICANT CHANGE UP (ref 3.8–10.5)

## 2022-10-25 PROCEDURE — 99284 EMERGENCY DEPT VISIT MOD MDM: CPT

## 2022-10-25 RX ORDER — DILTIAZEM HCL 120 MG
1 CAPSULE, EXT RELEASE 24 HR ORAL
Qty: 28 | Refills: 0
Start: 2022-10-25 | End: 2022-11-07

## 2022-10-25 RX ORDER — DILTIAZEM HCL 120 MG
90 CAPSULE, EXT RELEASE 24 HR ORAL ONCE
Refills: 0 | Status: COMPLETED | OUTPATIENT
Start: 2022-10-25 | End: 2022-10-25

## 2022-10-25 RX ORDER — DILTIAZEM HCL 120 MG
5 CAPSULE, EXT RELEASE 24 HR ORAL ONCE
Refills: 0 | Status: COMPLETED | OUTPATIENT
Start: 2022-10-25 | End: 2022-10-25

## 2022-10-25 RX ORDER — APIXABAN 2.5 MG/1
5 TABLET, FILM COATED ORAL EVERY 12 HOURS
Refills: 0 | Status: ACTIVE | OUTPATIENT
Start: 2022-10-25 | End: 2023-09-23

## 2022-10-25 RX ORDER — APIXABAN 2.5 MG/1
1 TABLET, FILM COATED ORAL
Qty: 28 | Refills: 0
Start: 2022-10-25 | End: 2022-11-07

## 2022-10-25 RX ADMIN — APIXABAN 5 MILLIGRAM(S): 2.5 TABLET, FILM COATED ORAL at 08:28

## 2022-10-25 RX ADMIN — Medication 90 MILLIGRAM(S): at 08:18

## 2022-10-25 RX ADMIN — Medication 5 MILLIGRAM(S): at 08:17

## 2022-10-25 NOTE — ED ADULT NURSE REASSESSMENT NOTE - COMFORT CARE
ambulated to bathroom
cardiac monitor in place/plan of care explained/po fluids offered
meal provided/plan of care explained/po fluids offered
plan of care explained

## 2022-10-25 NOTE — CONSULT NOTE ADULT - SUBJECTIVE AND OBJECTIVE BOX
[facility] Cardiology Consultation    CHIEF COMPLAINT: palps     HISTORY OF PRESENT ILLNESS: 68 y/o male with history of AF presented secondary to palpitations. Symptoms noted [place] in the city. No chest pain. No syncope. He is resting comfortable. Cards consulted for AF management.     PAST MEDICAL & SURGICAL HISTORY:  S/P thoracotomy  AF       Allergies: Levaquin (Unknown)      MEDICATIONS:  sleep aid     FAMILY HISTORY: unremarkable     SOCIAL HISTORY:  no EtOH, tobacco or drug use    REVIEW OF SYSTEMS:  CONSTITUTIONAL: No fever, weight loss, or fatigue  EYES: No eye pain, visual disturbances, or discharge  ENMT:  No difficulty hearing, tinnitus, vertigo; No sinus or throat pain  NECK: No pain or stiffness  BREASTS: No pain, masses, or nipple discharge  RESPIRATORY: No cough, wheezing, chills or hemoptysis; No Shortness of Breath  CARDIOVASCULAR: No chest pain, palpitations, dizziness, or leg swelling  GASTROINTESTINAL: No abdominal or epigastric pain. No nausea, vomiting, or hematemesis; No diarrhea or constipation. No melena or hematochezia.  GENITOURINARY: No dysuria, frequency, hematuria, or incontinence  NEUROLOGICAL: No headaches, memory loss, loss of strength, numbness, or tremors  SKIN: No itching, burning, rashes, or lesions   LYMPH Nodes: No enlarged glands  ENDOCRINE: No heat or cold intolerance; No hair loss  MUSCULOSKELETAL: No joint pain or swelling; No muscle, back, or extremity pain  PSYCHIATRIC: No depression, anxiety, mood swings, or difficulty sleeping  HEME/LYMPH: No easy bruising, or bleeding gums  ALLERY AND IMMUNOLOGIC: No hives or eczema	      PHYSICAL EXAM:  T(C): 36.6 (10-25-22 @ 09:22), Max: 36.8 (10-24-22 @ 13:48)  HR: 78 (10-25-22 @ 09:22) (78 - 165)  BP: 112/76 (10-25-22 @ 09:22) (102/62 - 200/110)  RR: 18 (10-25-22 @ 09:22) (16 - 18)  SpO2: 95% (10-25-22 @ 09:22) (95% - 98%)  Appearance: middle aged man, NAD   HEENT:   Normal oral mucosa, PERRL, EOMI	  Lymphatic: No lymphadenopathy  Cardiovascular: Normal S1 S2, No JVD, No murmurs, No edema  Respiratory: Lungs clear to auscultation	  Psychiatry: A & O x 3, Mood & affect appropriate  Gastrointestinal:  Soft, Non-tender, + BS	  Skin: No rashes, No ecchymoses, No cyanosis	  Neurologic: Non-focal  Extremities: Normal range of motion, No clubbing, cyanosis or edema  Vascular: Peripheral pulses palpable 2+ bilaterally  	    ECG:  	AF no st-t     LABS:	 	  CARDIAC MARKERS:                                  13.8   6.75  )-----------( 172      ( 25 Oct 2022 04:05 )             41.8     10-25    140  |  106  |  18  ----------------------------<  109<H>  3.7   |  28  |  1.00    Ca    8.9      25 Oct 2022 04:05  Mg     2.0     10-25    TPro  6.2<L>  /  Alb  3.3<L>  /  TBili  0.5  /  DBili  x   /  AST  17  /  ALT  23  /  AlkPhos  48  10-25    proBNP: Serum Pro-Brain Natriuretic Peptide: 1883 pg/mL (10-25 @ 04:05)  Serum Pro-Brain Natriuretic Peptide: 157 pg/mL (10-24 @ 14:15)    ASSESSMENT/PLAN: 	68 y/o man with pAF   1. patient seen and examined, chart reviewed.   2. stable for discharge, will see cardiologist in   3. send home on cardizem 60 mg  BID   4. send home on eliquis 5 mg BID  5. good candidate for o/p cardioversion    I spent 40 min in care of this patient

## 2022-10-25 NOTE — ED ADULT NURSE REASSESSMENT NOTE - NS ED NURSE REASSESS COMMENT FT1
Nurse manager from [facility] called [facility] to speak to RN to receive update/report Nurse manager [facility] to receive update/report. RN manager name is Karyna  No bed had been assigned. Transfer center called [facility], no bed available, but they are working on making a bed available

## 2022-10-25 NOTE — ED ADULT NURSE REASSESSMENT NOTE - GENERAL PATIENT STATE
comfortable appearance
comfortable appearance/cooperative
comfortable appearance
comfortable appearance/cooperative

## 2022-10-25 NOTE — ED ADULT NURSE REASSESSMENT NOTE - NS ED NURSE REASSESS COMMENT FT1
received pt from [facility]. pt reports wanting to leave, feels the same, meds given here are the same meds and not working which is why he came here and wants to sign out AMA since "this facility does not have a cardiologist. My cardiologist is at [facility] and he has privileges over there. I am going to drive up there and myself". MD Horner made aware, reports she will talk to patient. pt on telemetry, will continue to monitor

## 2022-10-25 NOTE — ED ADULT NURSE REASSESSMENT NOTE - NS ED NURSE REASSESS COMMENT FT1
Pt. is AAOx4. Pt. is speaking in full sentences with non labored breathing or use of accessory muscle. Pt. denies CP/palpitations, HA, dizziness, lightheadedness, n/v/d or abd pain. Remains in Afib. Pending bed availability.

## 2022-10-27 DIAGNOSIS — I48.91 UNSPECIFIED ATRIAL FIBRILLATION: ICD-10-CM

## 2022-10-27 DIAGNOSIS — Z88.1 ALLERGY STATUS TO OTHER ANTIBIOTIC AGENTS STATUS: ICD-10-CM

## 2022-10-27 DIAGNOSIS — Z98.890 OTHER SPECIFIED POSTPROCEDURAL STATES: ICD-10-CM

## 2022-10-27 DIAGNOSIS — R42 DIZZINESS AND GIDDINESS: ICD-10-CM

## 2022-10-27 DIAGNOSIS — Z20.822 CONTACT WITH AND (SUSPECTED) EXPOSURE TO COVID-19: ICD-10-CM

## 2022-10-27 DIAGNOSIS — R00.2 PALPITATIONS: ICD-10-CM

## 2022-10-27 DIAGNOSIS — Z85.118 PERSONAL HISTORY OF OTHER MALIGNANT NEOPLASM OF BRONCHUS AND LUNG: ICD-10-CM

## 2023-03-22 DIAGNOSIS — M54.2 CERVICALGIA: ICD-10-CM

## 2023-03-24 ENCOUNTER — APPOINTMENT (OUTPATIENT)
Dept: ORTHOPEDIC SURGERY | Facility: CLINIC | Age: 68
End: 2023-03-24
Payer: MEDICARE

## 2023-03-24 VITALS — HEART RATE: 73 BPM | DIASTOLIC BLOOD PRESSURE: 71 MMHG | SYSTOLIC BLOOD PRESSURE: 120 MMHG

## 2023-03-24 DIAGNOSIS — M50.30 OTHER CERVICAL DISC DEGENERATION, UNSPECIFIED CERVICAL REGION: ICD-10-CM

## 2023-03-24 PROCEDURE — 99214 OFFICE O/P EST MOD 30 MIN: CPT

## 2023-03-24 PROCEDURE — 72050 X-RAY EXAM NECK SPINE 4/5VWS: CPT

## 2023-03-24 RX ORDER — TIZANIDINE 2 MG/1
2 TABLET ORAL EVERY 6 HOURS
Qty: 24 | Refills: 0 | Status: ACTIVE | COMMUNITY
Start: 2023-03-24 | End: 1900-01-01

## 2023-03-24 NOTE — ASSESSMENT
[FreeTextEntry1] : I had a lengthy discussion with the patient in regards to treatment plan and diagnosis. There are no red flag findings on imaging nor are there any red flag findings on clinical exams.  Therefore we will proceed with a course of conservative treatment. This would include physical therapy/home exercise program, Tylenol, NSAIDs as medically indicated. A prescription was provided for physical therapy. Rx Tizanidine The patient will follow up with me in approximately 4 to 6 weeks.  I encouraged the patient to follow-up sooner if there are any new or worsening symptoms.

## 2023-03-24 NOTE — PHYSICAL EXAM
[de-identified] : Cervical Physical Exam\par \par Gait - Normal\par \par Station - Normal\par \par Sagittal balance - Normal\par \par Compensatory mechanism? - None\par \par Horizontal gaze - Maintained\par \par Heel walk - Normal\par \par Toe walk - Normal\par \par Reflexes\par Biceps - Normal\par Triceps - Normal\par Brachioradialis - Normal\par Patellar - Normal\par Gastroc - Normal\par Clonus -No\par \par Day´s - None\par \par Shoulder Exam - Normal\par \par Spurling´s - None\par \par Wrist Pulses -2+ radial/ulnar\par \par Foot Pulses -2+ DP/PT\par \par Cervical range of motion - Normal\par \par Sensation\par C5-T1 sensation intact to light touch bilaterally\par \par L1-S1 sensation intact to light touch bilaterally\par \par Motor\par \par 	Deltoid	Biceps	Triceps	WF	WE	IO	\par Right	5/5	5/5	5/5	5/5	5/5	5/5	5/5\par Left	5/5	5/5	5/5	5/5	5/5	5/5	5/5\par \par \par 	IP	Quad	HS	TA	Gastroc	EHL\par Right	5/5	5/5	5/5	5/5	5/5	5/5\par Left	5/5	5/5	5/5	5/5	5/5	5/5  [de-identified] : Cervical Radiographs reviewed \par facet arthropathy noted \par mild degenerative disc changes \par no instability with flexion or extension

## 2023-03-24 NOTE — HISTORY OF PRESENT ILLNESS
[de-identified] : 67 year old male who presents for follow-up evaluation of his lower back pain. Today, the patient reports his back pain has completely subsided. However, patient reports 1.5 weeks ago he was reaching down to pick something up and reports he heard a pop in his neck. Patient reports there is a sharp consistent pain to the back of his neck. He reports limited ROM due to pain, dizziness, headaches and difficulty sleeping due to pain. Patient reports taking Motrin for the pain with minor relief. \par Patient does report multiple incidents of feeling off balanced \par Denies any radicular sxs, issues with gait,  or dexterity \par \par 08/06/2021\par Today the patient states that overall he is doing very well.  He has lost weight.  He feels like his low back pain has significantly improved.  He denies any severe radiating pain down his legs.  He denies any weakness.  He denies any focal areas of numbness or tingling.  He denies any bowel bladder issues.  He denies any saddle anesthesia.\par \par 07/12/21\par  this is a 66-year-old male that has been dealing with severe low back pain for years.  Unfortunately acutely over the past several months it has severely worsened.  He has a stabbing pain in his right lower back.  He does describe pain that goes down his posterior thighs  bilaterally as well.  At this point it does interfere with his quality of life.  He does feel better when he leans forward on a shopping cart.  He denies any bowel bladder issues.  He denies any saddle anesthesia.

## 2023-03-24 NOTE — ADDENDUM
[FreeTextEntry1] : I, Elvie Holbrook, acted solely as a scribe for Dr. Sohail Gaines MD on this date 03/24/2023  \par \par All medical record entries made by the Scribe were at my, Dr. Sohail Gaines MD., direction and personally dictated by me on 03/24/2023 . I have reviewed the chart and agree that the record accurately reflects my personal performance of the history, physical exam, assessment and plan. I have also personally directed, reviewed, and agreed with the chart.

## 2023-09-07 ENCOUNTER — APPOINTMENT (OUTPATIENT)
Dept: ORTHOPEDIC SURGERY | Facility: CLINIC | Age: 68
End: 2023-09-07
Payer: MEDICARE

## 2023-09-07 VITALS — WEIGHT: 187 LBS | BODY MASS INDEX: 28.34 KG/M2 | HEIGHT: 68 IN

## 2023-09-07 DIAGNOSIS — M25.532 PAIN IN LEFT WRIST: ICD-10-CM

## 2023-09-07 PROCEDURE — 99213 OFFICE O/P EST LOW 20 MIN: CPT

## 2023-09-07 PROCEDURE — 73110 X-RAY EXAM OF WRIST: CPT | Mod: LT

## 2023-09-07 NOTE — HISTORY OF PRESENT ILLNESS
[Ambidextrous] : ambidextrous [FreeTextEntry1] : Pt is a 67 y/o male c/o left wrist pain for 3 months.  The pain is intermittent.  He cannot identify exacerbating factors.  When he has pain, it is sharp and sudden.  It radiates into his left hand.  It is not tender to touch.  He has not had treatment for this in the past. Pain occurs intermittently with activity. Denies numbness and tingling.

## 2023-09-07 NOTE — ADDENDUM
[FreeTextEntry1] : I, Justyna Arnold, wrote this note acting as a scribe for Dr. Fernando Michelle on September 7, 2023.

## 2023-09-07 NOTE — PHYSICAL EXAM
[de-identified] : Patient is WDWN, alert, and in no acute distress. Breathing is unlabored. He is grossly oriented to person, place, and time.  Left Wrist:  No tenderness, edema, or deformities. No thenar atrophy. Full ROM with decreased sensation along median nerve distribution. Tests/Signs: Tinel's sign is negative over carpal tunnel, Phalen's test is negative.   [de-identified] : AP, lateral and oblique views of the left wrist were obtained today and revealed no abnormalities. No acute fracture. No dislocation. Cartilage spaces are maintained.

## 2023-09-07 NOTE — DISCUSSION/SUMMARY
[FreeTextEntry1] : The underlying pathophysiology was reviewed with the patient. XR films were reviewed with the patient. Discussed at length the nature of the patient's condition. The left wrist symptoms are secondary to tendonitis.   Discussed option of cortisone injections for pain. Patient was not interested at this time. NSAIDs as needed. No further orthopedic interventions were deemed necessary at this time.		   All questions answered, understanding verbalized. Patient in agreement with plan of care. Patient may follow up if symptoms worsen. Follow up p.r.n.

## 2023-11-09 ENCOUNTER — TRANSCRIPTION ENCOUNTER (OUTPATIENT)
Age: 68
End: 2023-11-09

## 2024-03-09 ENCOUNTER — NON-APPOINTMENT (OUTPATIENT)
Age: 69
End: 2024-03-09

## 2024-03-28 NOTE — ED ADULT TRIAGE NOTE - WEIGHT IN LBS
Pre-Operative Diagnosis: Cervical radiculopathy [M54.12]     Post-Operative Diagnosis: Cervical radiculopathy [M54.12]      Procedure Performed:   anterior cervical discectomy at cervical 6-cervical 7 with placement of arthroplasty device    Surgeon(s) and Role:     * SOLE Beckford DO - Primary    Assistant(s):  Surgical Assistant.: Christiano Neri, RSA     Surgical Findings: large disc osteophyte complex at foramen on R     Specimen: none     Estimated Blood Loss: Blood Output: 10 mL (3/28/2024  9:47 AM)      Dictation Number:  to follow    Antonio Beckford DO  3/28/2024  10:04 AM           474